# Patient Record
Sex: MALE | Race: WHITE | NOT HISPANIC OR LATINO | Employment: FULL TIME | URBAN - METROPOLITAN AREA
[De-identification: names, ages, dates, MRNs, and addresses within clinical notes are randomized per-mention and may not be internally consistent; named-entity substitution may affect disease eponyms.]

---

## 2018-08-15 LAB
INFLUENZA A ANTIGEN, POC: NEGATIVE
INFLUENZA B ANTIGEN, POC: NEGATIVE
RAPID GROUP A STREP (OHS): POSITIVE

## 2019-08-28 ENCOUNTER — HISTORICAL (OUTPATIENT)
Dept: ADMINISTRATIVE | Facility: HOSPITAL | Age: 34
End: 2019-08-28

## 2022-04-07 ENCOUNTER — HISTORICAL (OUTPATIENT)
Dept: ADMINISTRATIVE | Facility: HOSPITAL | Age: 37
End: 2022-04-07
Payer: COMMERCIAL

## 2022-04-23 VITALS
SYSTOLIC BLOOD PRESSURE: 114 MMHG | WEIGHT: 200.81 LBS | HEIGHT: 71 IN | BODY MASS INDEX: 28.11 KG/M2 | OXYGEN SATURATION: 96 % | DIASTOLIC BLOOD PRESSURE: 78 MMHG

## 2022-09-15 ENCOUNTER — HISTORICAL (OUTPATIENT)
Dept: ADMINISTRATIVE | Facility: HOSPITAL | Age: 37
End: 2022-09-15
Payer: COMMERCIAL

## 2024-01-29 ENCOUNTER — HOSPITAL ENCOUNTER (OUTPATIENT)
Dept: RADIOLOGY | Facility: CLINIC | Age: 39
Discharge: HOME | End: 2024-01-29
Attending: ORTHOPAEDIC SURGERY
Payer: COMMERCIAL

## 2024-01-29 ENCOUNTER — OFFICE VISIT (OUTPATIENT)
Dept: ORTHOPEDICS | Facility: CLINIC | Age: 39
End: 2024-01-29
Payer: COMMERCIAL

## 2024-01-29 VITALS
HEART RATE: 56 BPM | DIASTOLIC BLOOD PRESSURE: 86 MMHG | SYSTOLIC BLOOD PRESSURE: 125 MMHG | WEIGHT: 210 LBS | HEIGHT: 71 IN | BODY MASS INDEX: 29.4 KG/M2

## 2024-01-29 DIAGNOSIS — M22.41 PATELLA, CHONDROMALACIA, RIGHT: Primary | ICD-10-CM

## 2024-01-29 DIAGNOSIS — M25.561 ACUTE PAIN OF RIGHT KNEE: ICD-10-CM

## 2024-01-29 PROCEDURE — 1159F MED LIST DOCD IN RCRD: CPT | Mod: CPTII,,, | Performed by: ORTHOPAEDIC SURGERY

## 2024-01-29 PROCEDURE — 73564 X-RAY EXAM KNEE 4 OR MORE: CPT | Mod: RT,,, | Performed by: ORTHOPAEDIC SURGERY

## 2024-01-29 PROCEDURE — 3008F BODY MASS INDEX DOCD: CPT | Mod: CPTII,,, | Performed by: ORTHOPAEDIC SURGERY

## 2024-01-29 PROCEDURE — 3074F SYST BP LT 130 MM HG: CPT | Mod: CPTII,,, | Performed by: ORTHOPAEDIC SURGERY

## 2024-01-29 PROCEDURE — 99203 OFFICE O/P NEW LOW 30 MIN: CPT | Mod: ,,, | Performed by: ORTHOPAEDIC SURGERY

## 2024-01-29 PROCEDURE — 3079F DIAST BP 80-89 MM HG: CPT | Mod: CPTII,,, | Performed by: ORTHOPAEDIC SURGERY

## 2024-01-29 RX ORDER — ACETAMINOPHEN 325 MG/1
325 TABLET ORAL EVERY 6 HOURS PRN
COMMUNITY
End: 2024-02-21

## 2024-01-29 RX ORDER — IBUPROFEN 200 MG
200 TABLET ORAL EVERY 6 HOURS PRN
COMMUNITY
End: 2024-02-21

## 2024-01-29 NOTE — PROGRESS NOTES
Chief Complaint:   Chief Complaint   Patient presents with    Right Knee - Pain    Knee Pain     Hit his patella with other knee on 1/24/24 and started having pain in knee. Sometimes the pain was a stabbing pain and pain is around his patella and behind his knee. Having a hard time putting all his weight on his leg and feels weak and unstable.        Consulting Physician: No ref. provider found    History of present illness:    he is a pleasant 38 y.o. year old male with right patella pain. He was sitting crossleg and straightened his leg injuring his patella. His pain is along the patella and posteriorly. He feels clicking with ROM. He notes instability. He's tried rest, activity modification, and antiinflammatories without relief.     History reviewed. No pertinent past medical history.    Past Surgical History:   Procedure Laterality Date    ELBOW SURGERY Right     tumor removal       Current Outpatient Medications   Medication Sig    acetaminophen (TYLENOL) 325 MG tablet Take 325 mg by mouth every 6 (six) hours as needed for Pain.    ibuprofen (ADVIL,MOTRIN) 200 MG tablet Take 200 mg by mouth every 6 (six) hours as needed for Pain.     No current facility-administered medications for this visit.       Review of patient's allergies indicates:  No Known Allergies    Family History   Problem Relation Age of Onset    Anesthesia problems Mother     Cancer Father     Cancer Brother     Cancer Paternal Aunt     Cancer Paternal Grandfather        Social History     Socioeconomic History    Marital status:    Tobacco Use    Smoking status: Never    Smokeless tobacco: Never   Substance and Sexual Activity    Alcohol use: Yes     Alcohol/week: 4.0 standard drinks of alcohol     Types: 4 Cans of beer per week     Comment: socially    Drug use: Never    Sexual activity: Yes       Review of Systems:    Constitution:   Denies chills, fever, and sweats.  HENT:   Denies headaches or blurry vision.  Cardiovascular:  Denies  "chest pain or irregular heart beat.  Respiratory:   Denies cough or shortness of breath.  Gastrointestinal:  Denies abdominal pain, nausea, or vomiting.  Musculoskeletal:   Denies muscle cramps.  Neurological:   Denies dizziness or focal weakness.  Psychiatric/Behavior: Normal mental status.  Hematology/Lymph:  Denies bleeding problem or easy bruising/bleeding.  Skin:    Denies rash or suspicious lesions.    Examination:    Vital Signs:    Vitals:    01/29/24 0759 01/29/24 0800   BP: 125/86    Pulse: (!) 56    Weight: 95.3 kg (210 lb)    Height: 5' 11" (1.803 m)    PainSc:    4       Body mass index is 29.29 kg/m².    Constitution:   Well-developed, well nourished patient in no acute distress.  Neurological:   Alert and oriented x 3 and cooperative to examination.     Psychiatric/Behavior: Normal mental status.  Respiratory:   No shortness of breath.  Eyes:    Extraoccular muscles intact  Skin:    No scars, rash or suspicious lesions.    MSK:   Standing exam  stance: normal alignment, no significant leg-length discrepancy  gait: antalgic limp    Knee examination  - General comments: unremarkable appearance    - Tenderness: global     Knee                  RIGHT    LEFT  Skin:                  Intact      Intact  ROM:                 0-130      0-130  Effusion:             trace        Neg  MJL TTP:           Neg         Neg  LJL TTP:            Neg         Neg  Kitty:         Neg         Neg  Pat crep:            +         Neg  Patella TTPs:     +         Neg  Patella grind:      Neg        Neg  Lachman:           Neg        Neg  Pivot shift:          Neg        Neg  Valgus stress:    Neg        Neg  Varus stress:      Neg        Neg  Posterior drawer: Neg       Neg    N-V intact intact  Hip: nml nml    Lower extremity edema:Negative     Imaging: X-rays ordered and images interpreted today personally by me of 4 views of right knee show 4 views of the right knee show normal bony alignment      Assessment: Patella, " chondromalacia, right  -     X-Ray Knee Complete 4 Or More Views Right; Future; Expected date: 01/29/2024  -     MRI Knee Without Contrast Right; Future; Expected date: 01/29/2024        Plan:  Concern for chondral injury. We will get an MRI to evaluate and see him back after for results.       Ty Beck MD personally performed the services described in this documentation, including but not limited to patient's history, physical examination, and assessment and plan of care. All medical record entries made by Leora Leyva NP were performed at his direction and in his presence. The medical record was reviewed and is accurate and complete.

## 2024-02-05 ENCOUNTER — OFFICE VISIT (OUTPATIENT)
Dept: ORTHOPEDICS | Facility: CLINIC | Age: 39
End: 2024-02-05
Payer: COMMERCIAL

## 2024-02-05 VITALS
HEIGHT: 71 IN | SYSTOLIC BLOOD PRESSURE: 137 MMHG | HEART RATE: 51 BPM | WEIGHT: 209 LBS | DIASTOLIC BLOOD PRESSURE: 85 MMHG | BODY MASS INDEX: 29.26 KG/M2

## 2024-02-05 DIAGNOSIS — M22.2X1 PATELLOFEMORAL PAIN SYNDROME OF RIGHT KNEE: Primary | ICD-10-CM

## 2024-02-05 PROCEDURE — 3008F BODY MASS INDEX DOCD: CPT | Mod: CPTII,,, | Performed by: ORTHOPAEDIC SURGERY

## 2024-02-05 PROCEDURE — 99213 OFFICE O/P EST LOW 20 MIN: CPT | Mod: ,,, | Performed by: ORTHOPAEDIC SURGERY

## 2024-02-05 PROCEDURE — 3079F DIAST BP 80-89 MM HG: CPT | Mod: CPTII,,, | Performed by: ORTHOPAEDIC SURGERY

## 2024-02-05 PROCEDURE — 3075F SYST BP GE 130 - 139MM HG: CPT | Mod: CPTII,,, | Performed by: ORTHOPAEDIC SURGERY

## 2024-02-05 PROCEDURE — 1159F MED LIST DOCD IN RCRD: CPT | Mod: CPTII,,, | Performed by: ORTHOPAEDIC SURGERY

## 2024-02-05 RX ORDER — MELOXICAM 15 MG/1
15 TABLET ORAL DAILY
Qty: 30 TABLET | Refills: 0 | Status: SHIPPED | OUTPATIENT
Start: 2024-02-05 | End: 2024-03-19 | Stop reason: SDUPTHER

## 2024-02-05 NOTE — PROGRESS NOTES
Chief Complaint:   Chief Complaint   Patient presents with    Right Knee - Pain    Knee Pain     MRI results right knee, still has pain in knee       Consulting Physician: No ref. provider found    History of present illness:    he is a pleasant 38 y.o. year old male with right patella pain. He was sitting crossleg and straightened his leg injuring his patella. His pain is along the patella and posteriorly. He feels clicking with ROM. He notes instability. He's tried rest, activity modification, and antiinflammatories without relief.     He returns today. Still has pain in knee. MRI results of right knee.     History reviewed. No pertinent past medical history.    Past Surgical History:   Procedure Laterality Date    ELBOW SURGERY Right     tumor removal       Current Outpatient Medications   Medication Sig    acetaminophen (TYLENOL) 325 MG tablet Take 325 mg by mouth every 6 (six) hours as needed for Pain.    ibuprofen (ADVIL,MOTRIN) 200 MG tablet Take 200 mg by mouth every 6 (six) hours as needed for Pain.     No current facility-administered medications for this visit.       Review of patient's allergies indicates:  No Known Allergies    Family History   Problem Relation Age of Onset    Anesthesia problems Mother     Cancer Father     Cancer Brother     Cancer Paternal Aunt     Cancer Paternal Grandfather     Cancer Paternal Uncle        Social History     Socioeconomic History    Marital status:    Tobacco Use    Smoking status: Never    Smokeless tobacco: Never   Substance and Sexual Activity    Alcohol use: Yes     Alcohol/week: 4.0 standard drinks of alcohol     Types: 4 Cans of beer per week     Comment: socially    Drug use: Never    Sexual activity: Yes     Partners: Female       Review of Systems:    Constitution:   Denies chills, fever, and sweats.  HENT:   Denies headaches or blurry vision.  Cardiovascular:  Denies chest pain or irregular heart beat.  Respiratory:   Denies cough or shortness of  "breath.  Gastrointestinal:  Denies abdominal pain, nausea, or vomiting.  Musculoskeletal:   Denies muscle cramps.  Neurological:   Denies dizziness or focal weakness.  Psychiatric/Behavior: Normal mental status.  Hematology/Lymph:  Denies bleeding problem or easy bruising/bleeding.  Skin:    Denies rash or suspicious lesions.    Examination:    Vital Signs:    Vitals:    02/05/24 0835 02/05/24 0836   BP: 137/85    Pulse: (!) 51    Weight: 94.8 kg (209 lb)    Height: 5' 11" (1.803 m)    PainSc:    4       Body mass index is 29.15 kg/m².    Constitution:   Well-developed, well nourished patient in no acute distress.  Neurological:   Alert and oriented x 3 and cooperative to examination.     Psychiatric/Behavior: Normal mental status.  Respiratory:   No shortness of breath.  Eyes:    Extraoccular muscles intact  Skin:    No scars, rash or suspicious lesions.    MSK:   Standing exam  stance: normal alignment, no significant leg-length discrepancy  gait: antalgic limp    Knee examination  - General comments: unremarkable appearance    - Tenderness: global     Knee                  RIGHT    LEFT  Skin:                  Intact      Intact  ROM:                 0-130      0-130  Effusion:             trace        Neg  MJL TTP:           Neg         Neg  LJL TTP:            Neg         Neg  Kitty:         Neg         Neg  Pat crep:            +         Neg  Patella TTPs:     +         Neg  Patella grind:      Neg        Neg  Lachman:           Neg        Neg  Pivot shift:          Neg        Neg  Valgus stress:    Neg        Neg  Varus stress:      Neg        Neg  Posterior drawer: Neg       Neg    N-V intact intact  Hip: nml nml    Lower extremity edema:Negative     Imaging: Prior X-ray images interpreted personally by me of 4 views of right knee show 4 views of the right knee show normal bony alignment and MRI of right knee shows some inflammation along the lateral aspect of the patella..     Assessment: Patellofemoral " pain syndrome of right knee    Other orders  -     meloxicam (MOBIC) 15 MG tablet; Take 1 tablet (15 mg total) by mouth once daily.  Dispense: 30 tablet; Refill: 0          Plan:  I will send him some anti inflammatory medicine to help with the pain. If pain persists we can do a corticosteroid injection into the knee. Patient will return as needed or if pain persists.       Ty Beck MD personally performed the services described in this documentation, including but not limited to patient's history, physical examination, and assessment and plan of care. All medical record entries made by Lea Lerma, ATC, OTC were performed at his direction and in his presence. The medical record was reviewed and is accurate and complete.

## 2024-03-07 ENCOUNTER — PROCEDURE VISIT (OUTPATIENT)
Dept: RESPIRATORY THERAPY | Facility: HOSPITAL | Age: 39
End: 2024-03-07
Attending: FAMILY MEDICINE
Payer: COMMERCIAL

## 2024-03-07 DIAGNOSIS — R06.02 SOB (SHORTNESS OF BREATH): ICD-10-CM

## 2024-03-07 PROCEDURE — 94010 BREATHING CAPACITY TEST: CPT

## 2024-03-07 PROCEDURE — 94729 DIFFUSING CAPACITY: CPT

## 2024-03-07 PROCEDURE — 94727 GAS DIL/WSHOT DETER LNG VOL: CPT

## 2025-01-07 DIAGNOSIS — R06.02 SHORTNESS OF BREATH: Primary | ICD-10-CM

## 2025-01-14 ENCOUNTER — PROCEDURE VISIT (OUTPATIENT)
Dept: RESPIRATORY THERAPY | Facility: HOSPITAL | Age: 40
End: 2025-01-14
Attending: PHYSICAL MEDICINE & REHABILITATION
Payer: OTHER GOVERNMENT

## 2025-01-14 VITALS — OXYGEN SATURATION: 98 % | RESPIRATION RATE: 19 BRPM | HEART RATE: 65 BPM

## 2025-01-14 DIAGNOSIS — R06.02 SHORTNESS OF BREATH: ICD-10-CM

## 2025-01-14 PROCEDURE — 94060 EVALUATION OF WHEEZING: CPT

## 2025-01-14 PROCEDURE — 94729 DIFFUSING CAPACITY: CPT

## 2025-01-14 PROCEDURE — 94760 N-INVAS EAR/PLS OXIMETRY 1: CPT

## 2025-01-14 PROCEDURE — 94727 GAS DIL/WSHOT DETER LNG VOL: CPT

## 2025-01-14 RX ORDER — ALBUTEROL SULFATE 0.83 MG/ML
SOLUTION RESPIRATORY (INHALATION)
Status: DISPENSED
Start: 2025-01-14 | End: 2025-01-14

## 2025-01-14 RX ORDER — ALBUTEROL SULFATE 0.83 MG/ML
2.5 SOLUTION RESPIRATORY (INHALATION)
Status: COMPLETED | OUTPATIENT
Start: 2025-01-14 | End: 2025-01-14

## 2025-01-14 RX ADMIN — ALBUTEROL SULFATE 2.5 MG: 0.83 SOLUTION RESPIRATORY (INHALATION) at 08:01

## 2025-01-16 LAB
DLCO SINGLE BREATH LLN: 26.81
DLCO SINGLE BREATH PRE REF: 69.5 %
DLCO SINGLE BREATH REF: 33.74
DLCOC SBVA LLN: 3.41
DLCOC SBVA REF: 4.6
DLCOC SINGLE BREATH LLN: 26.81
DLCOC SINGLE BREATH REF: 33.74
DLCOCSBVAULN: 5.8
DLCOCSINGLEBREATHULN: 40.67
DLCOSINGLEBREATHULN: 40.67
DLCOVA LLN: 3.41
DLCOVA PRE REF: 110.6 %
DLCOVA PRE: 5.09 ML/(MIN*MMHG*L) (ref 3.41–5.8)
DLCOVA REF: 4.6
DLCOVAULN: 5.8
ERV LLN: -16448.54
ERV PRE REF: 45 %
ERV REF: 1.46
ERVULN: ABNORMAL
FEF 25 75 LLN: 2.72
FEF 25 75 PRE REF: 88.2 %
FEF 25 75 REF: 4.44
FEF2575CHANGE: -4.3 %
FET100CHANGE: 18.1 %
FEV1 FVC LLN: 70
FEV1 FVC PRE REF: 100.3 %
FEV1 FVC REF: 80
FEV1 LLN: 3.42
FEV1 PRE REF: 87.3 %
FEV1 REF: 4.31
FEV1CHANGE: -4.7 %
FEV1FVCCHANGE: 1.3 %
FRCPLETH LLN: 2.51
FRCPLETH PREREF: 59.4 %
FRCPLETH REF: 3.5
FRCPLETHULN: 4.49
FVC LLN: 4.29
FVC PRE REF: 86.6 %
FVC REF: 5.4
FVCCHANGE: -5.9 %
IVC PRE: 3.49 L (ref 4.29–6.52)
IVC SINGLE BREATH LLN: 4.29
IVC SINGLE BREATH PRE REF: 64.6 %
IVC SINGLE BREATH REF: 5.4
IVCSINGLEBREATHULN: 6.52
LLN IC: -9999996.26
MVV LLN: 139
MVV PRE REF: 69.5 %
MVV REF: 163
PEF LLN: 7.99
PEF PRE REF: 78.1 %
PEF REF: 10.38
PEFCHANGE: 9.2 %
POST FEF 25 75: 3.74 L/S (ref 2.72–6.15)
POST FET 100: 7.25 SEC
POST FEV1 FVC: 81.56 % (ref 69.86–89.14)
POST FEV1: 3.59 L (ref 3.42–5.18)
POST FVC: 4.4 L (ref 4.29–6.52)
POST PEF: 8.85 L/S (ref 7.99–12.76)
PRE DLCO: 23.45 ML/(MIN*MMHG) (ref 26.81–40.67)
PRE ERV: 0.66 L (ref -16448.54–16451.46)
PRE FEF 25 75: 3.91 L/S (ref 2.72–6.15)
PRE FET 100: 6.14 SEC
PRE FEV1 FVC: 80.51 % (ref 69.86–89.14)
PRE FEV1: 3.77 L (ref 3.42–5.18)
PRE FRC PL: 2.08 L (ref 2.51–4.49)
PRE FVC: 4.68 L (ref 4.29–6.52)
PRE IC: 4.02 L (ref -9999996.26–#######.####)
PRE MVV: 113.38 L/MIN (ref 138.57–187.48)
PRE PEF: 8.1 L/S (ref 7.99–12.76)
PRE REF IC: 107.5 %
PRE RV: 1.42 L (ref 1.36–2.71)
PRE TLC: 6.1 L (ref 6.18–8.48)
RAW PRE REF: 105.5 %
RAW PRE: 3.23 CMH2O*S/L (ref 3.06–3.06)
RAW REF: 3.06
REF IC: 3.74
RV LLN: 1.36
RV PRE REF: 69.7 %
RV REF: 2.03
RVTLC LLN: 21
RVTLC PRE REF: 77.7 %
RVTLC PRE: 23.26 % (ref 20.97–38.93)
RVTLC REF: 30
RVTLCULN: 39
RVULN: 2.71
SGAW PRE REF: 132 %
SGAW PRE: 0.11 1/(CMH2O*S) (ref 0.08–0.08)
SGAW REF: 0.08
TLC LLN: 6.18
TLC PRE REF: 83.2 %
TLC REF: 7.33
TLC ULN: 8.48
ULN IC: ABNORMAL
VA PRE: 4.61 L (ref 7.18–7.18)
VA SINGLE BREATH PRE REF: 64.2 %
VA SINGLE BREATH REF: 7.18
VC LLN: 4.29
VC PRE REF: 86.6 %
VC PRE: 4.68 L (ref 4.29–6.52)
VC REF: 5.4
VC ULN: 6.52

## 2025-03-12 RX ORDER — METHYLPREDNISOLONE 4 MG/1
TABLET ORAL
Qty: 21 EACH | Refills: 0 | Status: SHIPPED | OUTPATIENT
Start: 2025-03-12

## 2025-03-12 RX ORDER — TIZANIDINE 4 MG/1
4 TABLET ORAL EVERY 8 HOURS
Qty: 21 TABLET | Refills: 0 | Status: SHIPPED | OUTPATIENT
Start: 2025-03-12 | End: 2025-03-19

## 2025-03-12 RX ORDER — ONDANSETRON 4 MG/1
8 TABLET, ORALLY DISINTEGRATING ORAL EVERY 8 HOURS PRN
Qty: 30 TABLET | Refills: 0 | Status: SHIPPED | OUTPATIENT
Start: 2025-03-12